# Patient Record
Sex: MALE | Race: WHITE | Employment: OTHER | URBAN - METROPOLITAN AREA
[De-identification: names, ages, dates, MRNs, and addresses within clinical notes are randomized per-mention and may not be internally consistent; named-entity substitution may affect disease eponyms.]

---

## 2019-02-26 ENCOUNTER — HOSPITAL ENCOUNTER (OUTPATIENT)
Dept: LAB | Facility: MEDICAL CENTER | Age: 54
End: 2019-02-26
Attending: PHYSICIAN ASSISTANT
Payer: OTHER GOVERNMENT

## 2019-02-26 ENCOUNTER — HOSPITAL ENCOUNTER (OUTPATIENT)
Facility: MEDICAL CENTER | Age: 54
End: 2019-02-26
Attending: PHYSICIAN ASSISTANT
Payer: OTHER GOVERNMENT

## 2019-02-26 PROCEDURE — 82365 CALCULUS SPECTROSCOPY: CPT

## 2019-02-28 LAB — AMBIGUOUS DTTM AMBI4: NORMAL

## 2019-03-02 LAB
APPEARANCE STONE: NORMAL
COMPN STONE: NORMAL
NUMBER STONE: 2
SIZE STONE: NORMAL MM
SPECIMEN WT: 50 MG

## 2024-09-20 ENCOUNTER — OFFICE VISIT (OUTPATIENT)
Dept: UROLOGY | Facility: MEDICAL CENTER | Age: 59
End: 2024-09-20
Payer: OTHER GOVERNMENT

## 2024-09-20 DIAGNOSIS — N40.1 BPH WITH OBSTRUCTION/LOWER URINARY TRACT SYMPTOMS: ICD-10-CM

## 2024-09-20 DIAGNOSIS — N20.0 KIDNEY STONE: ICD-10-CM

## 2024-09-20 DIAGNOSIS — N13.8 BPH WITH OBSTRUCTION/LOWER URINARY TRACT SYMPTOMS: ICD-10-CM

## 2024-09-20 PROCEDURE — 99204 OFFICE O/P NEW MOD 45 MIN: CPT | Performed by: UROLOGY

## 2024-09-20 RX ORDER — TERAZOSIN 5 MG/1
5 CAPSULE ORAL NIGHTLY
Qty: 90 CAPSULE | Refills: 3 | Status: SHIPPED | OUTPATIENT
Start: 2024-09-20

## 2024-09-20 NOTE — PROGRESS NOTES
Chief Complaint: BPH/LUTS    The patient was referred by Saeed Olsen M.D. for evaluation of the above chief complaint    History of Present Illness:    Yovani Bloom is a 59 y.o. male who presents today for evaluation of BPH/LUTS  - History of symptoms x 9-12 months  - IPSS 20 and QOL mostly dissatisfied  - Has taken terazosin x 2 months  - Has seen some benefit with the terazosin  - Most bothersome symptom is urgency  - Has failed conservative measures  - No hematuria or UTI symptoms    - Currently on viagra PRN  - 5-10 years of use    - History of kidney stones  - 10+ spontaneous passages over his life time  - ESWL x 2 in his lifetime, most recently 5 years ago  - CaOX stone in 2019    - History of liver cyst decortication in 2022  - Laparoscopic treatment at [facility]    Subjective    No family history on file.  Social History     Socioeconomic History    Marital status:      Spouse name: Not on file    Number of children: Not on file    Years of education: Not on file    Highest education level: Not on file   Occupational History    Not on file   Tobacco Use    Smoking status: Never    Smokeless tobacco: Not on file   Substance and Sexual Activity    Alcohol use: Not on file    Drug use: Not on file    Sexual activity: Not on file   Other Topics Concern    Not on file   Social History Narrative    Not on file     Social Determinants of Health     Financial Resource Strain: Not on file   Food Insecurity: Not on file   Transportation Needs: Not on file   Physical Activity: Not on file   Stress: Not on file   Social Connections: Not on file   Intimate Partner Violence: Not on file   Housing Stability: Not on file     No past surgical history on file.  No past medical history on file.  Current Outpatient Medications   Medication Sig Dispense Refill    Pseudoephedrine HCl (SUDAFED 12 HOUR PO) Take  by mouth.      ibuprofen (IBU) 400 MG TABS Take 400 mg by mouth every 6 hours as needed.      maalox  plus-benadryl-xylocaine (MBX) Take 5 mL by mouth every 6 hours as needed (gargle and spit). For throat pain 180 mL 0    hydrocodone-acetaminophen (NORCO) 5-325 MG TABS per tablet Take 1-2 Tabs by mouth every 6 hours as needed (for pain, take with food.). Not to be used when working or driving 20 Each 0     No current facility-administered medications for this visit.     Allergies   Allergen Reactions    Lisinopril      Cough    Sulfa Drugs      Flushing       Review of systems was conducted and was negative except for as explicitly listed in the History of Present Illness.       Objective   There were no vitals taken for this visit.  Physical Exam  Vitals reviewed.   Constitutional:       Appearance: He is obese.   HENT:      Head: Normocephalic.      Nose: Nose normal.      Mouth/Throat:      Pharynx: Oropharynx is clear.   Eyes:      Conjunctiva/sclera: Conjunctivae normal.   Cardiovascular:      Rate and Rhythm: Normal rate.      Pulses: Normal pulses.   Pulmonary:      Effort: Pulmonary effort is normal.   Abdominal:      Palpations: Abdomen is soft.   Musculoskeletal:         General: Normal range of motion.      Cervical back: Neck supple.   Skin:     General: Skin is warm.   Neurological:      Mental Status: He is alert. Mental status is at baseline.   Psychiatric:         Mood and Affect: Mood normal.         Lab/Radiology/Diagnostic Review:  Labs are unavailable for review ([facility])  PSA reportedly less than 4 ng/mL    Ultrasounds for liver cyst with ?renal stones    I have reviewed and independently examined the lab and imaging results.  My findings are given in the HPI or above with the associated tests.        Assessment & Plan    It was a pleasure speaking with Yovani Bloom today.    Yovani Bloom is a 59 y.o. male w/ multiple  issues  1) BPH/LUTS   - Discussed AUA guidelines   - Discussed medication options, including alpha blockers, 5-IRLANDA, and PDE5i   -  Discussed tamsulosin as a first line medication   - Discussed risks, benefits, alternatives, and side effects to this medication   - He cannot tolerate tamsulosin due to sulfa allergy  - Has started terazosin 2 mg daily already  - Will increase to 5 mg daily  - Discussed need to check BP frequently after increasing terazosin dosage   - Discussed combination therapy as an option if symptoms are persistent  2) ED  - Will continue with PRN viagra for now  - May consider changing to daily cialis for BPH/LUTS if combination therapy is indicated  3) Kidney stones  - Long history of kidney stones  - Recommend CT to evaluate stone burden  - Will order and call with results

## 2024-10-12 ENCOUNTER — HOSPITAL ENCOUNTER (OUTPATIENT)
Dept: RADIOLOGY | Facility: MEDICAL CENTER | Age: 59
End: 2024-10-12
Attending: UROLOGY
Payer: OTHER GOVERNMENT

## 2024-10-12 DIAGNOSIS — N20.0 KIDNEY STONE: ICD-10-CM

## 2024-10-12 PROCEDURE — 74176 CT ABD & PELVIS W/O CONTRAST: CPT

## 2024-10-12 PROCEDURE — 74176 CT ABD & PELVIS W/O CONTRAST: CPT | Performed by: RADIOLOGY
